# Patient Record
Sex: MALE | Race: OTHER | NOT HISPANIC OR LATINO | URBAN - METROPOLITAN AREA
[De-identification: names, ages, dates, MRNs, and addresses within clinical notes are randomized per-mention and may not be internally consistent; named-entity substitution may affect disease eponyms.]

---

## 2017-06-30 ENCOUNTER — EMERGENCY (EMERGENCY)
Facility: HOSPITAL | Age: 22
LOS: 0 days | Discharge: HOME | End: 2017-06-30

## 2017-06-30 DIAGNOSIS — L29.8 OTHER PRURITUS: ICD-10-CM

## 2017-06-30 DIAGNOSIS — L23.9 ALLERGIC CONTACT DERMATITIS, UNSPECIFIED CAUSE: ICD-10-CM

## 2017-11-18 ENCOUNTER — EMERGENCY (EMERGENCY)
Facility: HOSPITAL | Age: 22
LOS: 0 days | Discharge: HOME | End: 2017-11-18

## 2017-11-18 DIAGNOSIS — R10.84 GENERALIZED ABDOMINAL PAIN: ICD-10-CM

## 2017-11-18 DIAGNOSIS — R06.02 SHORTNESS OF BREATH: ICD-10-CM

## 2017-11-18 DIAGNOSIS — R11.10 VOMITING, UNSPECIFIED: ICD-10-CM

## 2018-03-09 ENCOUNTER — EMERGENCY (EMERGENCY)
Facility: HOSPITAL | Age: 23
LOS: 0 days | Discharge: HOME | End: 2018-03-10
Attending: EMERGENCY MEDICINE
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
## 2018-03-09 DIAGNOSIS — M79.631 PAIN IN RIGHT FOREARM: ICD-10-CM
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
## 2018-03-09 DIAGNOSIS — M25.511 PAIN IN RIGHT SHOULDER: ICD-10-CM
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
## 2018-03-09 RX ORDER — IBUPROFEN 200 MG
600 TABLET ORAL ONCE
Qty: 0 | Refills: 0 | Status: COMPLETED | OUTPATIENT
Start: 2018-03-09 | End: 2018-03-09

## 2018-03-09 RX ADMIN — Medication 600 MILLIGRAM(S): at 23:14

## 2018-03-09 NOTE — ED PEDIATRIC NURSE NOTE - OBJECTIVE STATEMENT
Patient c/o right arm pain from a fall that occurred on Wednesday. Denies LOC or any other symptoms.

## 2018-03-09 NOTE — ED PEDIATRIC NURSE NOTE - CHPI ED SYMPTOMS NEG
no loss of consciousness/no numbness/no abrasion/no bleeding/no weakness/no vomiting/no confusion/no fever/no deformity/no tingling

## 2018-03-10 NOTE — ED PROVIDER NOTE - ATTENDING CONTRIBUTION TO CARE
23yo m no pmh p/w R shoulder pain x 2d. Slipped on ice and fell onto R side 2d ago, since that time has had pain to R shoulder, worse w/mvmt. No head trauma/loc. Denies any focal numbness or weakness. PE: young m nad, ncat, neck supple, rrr nl s1s2, ctab, abd soft ntnd, R shoulder atraumatic, +ttp over ant joint/upper humerus, shoulder rom limited sec to pain, full rom/strength throughout remainder of ext, sensation intact, dpi, cr<2s, good  strength, remainder of ext exam nl. a/p: R shoulder pain s/p trauma - xr, pain control, reassess.

## 2018-03-10 NOTE — ED PROVIDER NOTE - CARE PLAN
Principal Discharge DX:	Shoulder pain  Goal:	xray r/o fracture  Assessment and plan of treatment:	motrin for pain, RICE, follow up orthopedics if pain does not resolve

## 2018-03-10 NOTE — ED PROVIDER NOTE - PHYSICAL EXAMINATION
General: Well developed; well nourished; in no acute distress    Eyes: EOM intact; conjunctiva and sclera clear, extra ocular movements intact  Head: Normocephalic; atraumatic  ENMT: External ear normal, tympanic membranes intact, no nasal discharge; airway clear, oropharynx clear  Neck: Supple; non tender; No cervical adenopathy  Respiratory: normal respiratory pattern, clear to auscultation bilaterally, no signs of increased work of breathing  Cardiovascular: Regular rate and rhythm. S1 and S2 Normal; No murmurs  Abdominal: Soft non-tender non-distended; normal bowel sounds; no hepatosplenomegaly; no masses  Extremities: R arm ROM limited due to pain, pain localized over inferior deltoid and upper humerus. No point tenderness over AC joint, clavicle intact. no gross deformity seen.  Neurological: Grossly intact  Skin: Warm and dry. No acute rash  Psychiatric: Cooperative and appropriate

## 2018-03-10 NOTE — ED PROVIDER NOTE - NS ED ROS FT
General: [x ] negative  [ ] abnormal:   Respiratory: [x ] negative  [ ] abnormal:  Cardiovascular: [ x] negative  [ ] abnormal:  Gastrointestinal:[ x] negative  [ ] abnormal:  Genitourinary: [ x] negative  [ ] abnormal:  Musculoskeletal: [ ] negative  [ x] abnormal: see hpi  Endocrine: [ ] negative  [ ] abnormal:   Heme/Lymph: [ ] negative  [ ] abnormal:   Neurological: [ x] negative  [ ] abnormal:   Skin: [ x] negative  [ ] abnormal:   Psychiatric: [ x] negative  [ ] abnormal:   Allergy and Immunologic: [ ] negative  [ ] abnormal:   All other systems reviewed and negative: [ ]

## 2018-08-08 ENCOUNTER — EMERGENCY (EMERGENCY)
Facility: HOSPITAL | Age: 23
LOS: 0 days | Discharge: HOME | End: 2018-08-08
Attending: PEDIATRICS | Admitting: PEDIATRICS

## 2018-08-08 VITALS
HEART RATE: 90 BPM | WEIGHT: 171.96 LBS | OXYGEN SATURATION: 100 % | RESPIRATION RATE: 20 BRPM | SYSTOLIC BLOOD PRESSURE: 147 MMHG | DIASTOLIC BLOOD PRESSURE: 96 MMHG | TEMPERATURE: 98 F

## 2018-08-08 DIAGNOSIS — R06.00 DYSPNEA, UNSPECIFIED: ICD-10-CM

## 2018-08-08 DIAGNOSIS — J06.9 ACUTE UPPER RESPIRATORY INFECTION, UNSPECIFIED: ICD-10-CM

## 2018-08-08 RX ORDER — IPRATROPIUM/ALBUTEROL SULFATE 18-103MCG
3 AEROSOL WITH ADAPTER (GRAM) INHALATION ONCE
Qty: 0 | Refills: 0 | Status: COMPLETED | OUTPATIENT
Start: 2018-08-08 | End: 2018-08-08

## 2018-08-08 RX ORDER — IBUPROFEN 200 MG
600 TABLET ORAL ONCE
Qty: 0 | Refills: 0 | Status: COMPLETED | OUTPATIENT
Start: 2018-08-08 | End: 2018-08-08

## 2018-08-08 RX ADMIN — Medication 600 MILLIGRAM(S): at 01:41

## 2018-08-08 RX ADMIN — Medication 3 MILLILITER(S): at 01:41

## 2018-08-08 NOTE — ED PROVIDER NOTE - OBJECTIVE STATEMENT
HPI:    PMH:  BIRTHHx: FT   VACCINES:  UTD  SOCIAL:  denies EtOH/tobacco/illicit drug use HPI: 23 y/o here for eval of uri s/s feels like cant breathe nkda never admitted , afebrile     PMH: na  BIRTHHx: FT   VACCINES:  UTD  SOCIAL:  denies EtOH/tobacco/illicit drug use

## 2018-08-08 NOTE — ED PEDIATRIC NURSE NOTE - OBJECTIVE STATEMENT
Patient presents to ED with complaints of difficulty taking a deep breath for one hour. Patient states he has been congested with "fever of 100.3" for one day. Patient is alert and in NAD.

## 2018-12-14 ENCOUNTER — TRANSCRIPTION ENCOUNTER (OUTPATIENT)
Age: 23
End: 2018-12-14

## 2019-10-19 ENCOUNTER — EMERGENCY (EMERGENCY)
Facility: HOSPITAL | Age: 24
LOS: 0 days | Discharge: HOME | End: 2019-10-19
Attending: EMERGENCY MEDICINE | Admitting: EMERGENCY MEDICINE
Payer: SUBSIDIZED

## 2019-10-19 VITALS
DIASTOLIC BLOOD PRESSURE: 81 MMHG | TEMPERATURE: 98 F | SYSTOLIC BLOOD PRESSURE: 136 MMHG | OXYGEN SATURATION: 99 % | RESPIRATION RATE: 18 BRPM | HEART RATE: 83 BPM

## 2019-10-19 DIAGNOSIS — R05 COUGH: ICD-10-CM

## 2019-10-19 DIAGNOSIS — R06.02 SHORTNESS OF BREATH: ICD-10-CM

## 2019-10-19 DIAGNOSIS — J06.9 ACUTE UPPER RESPIRATORY INFECTION, UNSPECIFIED: ICD-10-CM

## 2019-10-19 DIAGNOSIS — R11.2 NAUSEA WITH VOMITING, UNSPECIFIED: ICD-10-CM

## 2019-10-19 DIAGNOSIS — R50.9 FEVER, UNSPECIFIED: ICD-10-CM

## 2019-10-19 PROCEDURE — 93010 ELECTROCARDIOGRAM REPORT: CPT

## 2019-10-19 PROCEDURE — 99284 EMERGENCY DEPT VISIT MOD MDM: CPT

## 2019-10-19 PROCEDURE — 71046 X-RAY EXAM CHEST 2 VIEWS: CPT | Mod: 26

## 2019-10-19 RX ORDER — ONDANSETRON 8 MG/1
4 TABLET, FILM COATED ORAL ONCE
Refills: 0 | Status: COMPLETED | OUTPATIENT
Start: 2019-10-19 | End: 2019-10-19

## 2019-10-19 RX ADMIN — ONDANSETRON 4 MILLIGRAM(S): 8 TABLET, FILM COATED ORAL at 18:43

## 2019-10-19 NOTE — ED PROVIDER NOTE - PATIENT PORTAL LINK FT
You can access the FollowMyHealth Patient Portal offered by [facility] by registering at the following website: [email or web address]. By joining [facility]’s FollowMyHealth portal, you will also be able to view your health information using other applications (apps) compatible with our system.

## 2019-10-19 NOTE — ED PROVIDER NOTE - PROGRESS NOTE DETAILS
patient feeling improved after zofran, tolerating water. will dc w pmd f/u. patient and family verbalize understanding of return precautions.

## 2019-10-19 NOTE — ED PROVIDER NOTE - NSFOLLOWUPCLINICS_GEN_ALL_ED_FT
[facility]  Medicine  [address], [place]   Phone: [phone]  Fax:   Follow Up Time: 1-3 Days

## 2019-10-19 NOTE — ED ADULT NURSE NOTE - OBJECTIVE STATEMENT
Patient c/o cough and sore throat x 2 days. This afternoon at 5pm patient woke up from nap and vomited 4 times NBNB emesis. Patient also c/o chest tightness and SOB since 5pm. On assessment no signs of resp distress noted. Patient states cough is nonproductive.

## 2019-10-19 NOTE — ED PROVIDER NOTE - NSFOLLOWUPINSTRUCTIONS_ED_ALL_ED_FT
Viral Respiratory Infection    A viral respiratory infection is an illness that affects parts of the body used for breathing, like the lungs, nose, and throat. It is caused by a germ called a virus. Symptoms can include runny nose, coughing, sneezing, fatigue, body aches, sore throat, fever, or headache. Over the counter medicine can be used to manage the symptoms but the infection typically goes away on its own in 5 to 10 days.     SEEK IMMEDIATE MEDICAL CARE IF YOU HAVE ANY OF THE FOLLOWING SYMPTOMS: shortness of breath, chest pain, fever over 10 days, or lightheadedness/dizziness.    Nausea / Vomiting    Nausea is the feeling that you have to vomit. As nausea gets worse, it can lead to vomiting. Vomiting puts you at an increased risk for dehydration. Older adults and people with other diseases or a weak immune system are at higher risk for dehydration. Drink clear fluids in small but frequent amounts as tolerated. Eat bland, easy-to-digest foods in small amounts as tolerated.    SEEK IMMEDIATE MEDICAL CARE IF YOU HAVE ANY OF THE FOLLOWING SYMPTOMS: fever, inability to keep sufficient fluids down, black or bloody vomitus, black or bloody stools, lightheadedness/dizziness, chest pain, severe headache, rash, shortness of breath, cold or clammy skin, confusion, pain with urination, or any signs of dehydration.

## 2019-10-19 NOTE — ED PROVIDER NOTE - NS ED ROS FT
CONSTITUTIONAL: (+) tactile fevers, (-) chills, (-) decreased appetite  EYES: (-) eye redness, (-) eye discharge  ENT: (-) ear pain, (+) congestion, (-) rhinorrhea, (-) sinus pain, (+) sore throat, (-) difficulty swallowing  CARDIO: (-) chest pain, (-) palpitations, (-) edema  PULM: (+) cough, (-) sputum, (-) shortness of breath, (-) wheezing, (-) hemoptysis, (-) stridor  GI: (+) nausea, (+) vomiting, (-) diarrhea, (-) constipation, (-) abdominal pain  MSK: (-) back pain, (-) myalgias  SKIN: (-) rashes, (-) pallor    *all other systems negative except as documented above and in the HPI*

## 2019-10-19 NOTE — ED PROVIDER NOTE - CLINICAL SUMMARY MEDICAL DECISION MAKING FREE TEXT BOX
I personally evaluated the patient. I reviewed the Resident’s or Physician Assistant’s note (as assigned above), and agree with the findings and plan except as documented in my note. Patient evaluated for vomiting, patient tolerated PO, multiple repeat abdominal exams unremarkable. I have fully discussed the medical management and delivery of care with the patient. I have discussed any available labs, imaging and treatment options with the patient. Patient confirms understanding and has been given detailed return precautions. Patient instructed to return to the ED should symptoms persist or worsen. Patient has demonstrated capacity and has verbalized understanding. Patient is well appearing upon discharge.

## 2019-10-19 NOTE — ED PROVIDER NOTE - ATTENDING CONTRIBUTION TO CARE
24 year old male, no sig pmhx, come sin with uri, ymptoms started 3 days ago, + dry-mildly productive cough, + fever at home, + sore throat, no cp/sob, no loc, + few episodes of nb nb vomiting, mainly after coughing, patient adamantly denies abdominal pain, no urinary symptoms    CONSTITUTIONAL: Well-developed; well-nourished; in no acute distress. Sitting up and providing appropriate history and physical examination  SKIN: skin exam is warm and dry, no acute rash.  HEAD: Normocephalic; atraumatic.  EYES: PERRL, 3 mm bilateral, no nystagmus, EOM intact; conjunctiva and sclera clear.  ENT: + Pharyngeal erythema, no exudate, no edema, no signs of obstruction, No nasal discharge; airway clear.  NECK: Supple; non tender. + full passive ROM in all directions. No JVD  CARD: S1, S2 normal; no murmurs, gallops, or rubs. Regular rate and rhythm. + Symmetric Strong Pulses  RESP: No wheezes, rales or rhonchi. Good air movement bilaterally  ABD: soft; non-distended; non-tender. No Rebound, No Guarding, No signs of peritonitis, No CVA tenderness. No pulsatile abdominal mass. + Strong and Symmetric Pulses  EXT: Normal ROM. No clubbing, cyanosis or edema. Dp and Pt Pulses intact. Cap refill less than 3 seconds  NEURO: CN 2-12 intact, normal finger to nose, normal romberg, stable gait, no sensory or motor deficits, Alert, oriented, grossly unremarkable. No Focal deficits. GCS 15. NIH 0  PSYCH: Cooperative, appropriate.

## 2019-10-19 NOTE — ED PROVIDER NOTE - PHYSICAL EXAMINATION
VITALS:  I have reviewed the initial vital signs.  GENERAL: Well-developed, well-nourished, in no acute distress. Nontoxic.  HEENT: Sclera clear. No conjunctival injection. EOMI, PERRLA. Mucous membranes moist, +posterior oropharynx erythema without swelling or lesions. Tonsils 2+ bilaterally and w/o exudate. Uvula midline and without edema. TM's clear b/l without bulging or erythema. +b/l nasal congestion. No sinus ttp.  NECK: supple w FROM. No cervical adenopathy. No meningismus.   CARDIO: RRR, nl S1 and S2. No murmurs, rubs, or gallops. No peripheral edema. 2+ radial and pedal pulses bilaterally.  PULM: Normal effort. CTA b/l without wheezes, rales, or rhonchi.  MSK: Normal, steady gait.  GI: Normal bowel sounds. Abdomen soft and non-distended. Nontender in all four quadrants without rebound or guarding.  SKIN: Warm, dry. No pallor or rashes. Capillary refill <2 seconds.  NEURO: A&Ox3. Speech clear. No gross motor/sensory deficits.

## 2019-10-19 NOTE — ED PROVIDER NOTE - OBJECTIVE STATEMENT
24 year old male w no significant pmhx presents to the ED for evaluation of mild, constant nausea that began around 5:00 PM today. Patient states he has had nonproductive cough, sore throat, and congestion x 3 days with subjective fevers at home. Today woke up from a nap and felt nauseated, experienced 4 episodes of NBNB vomiting. Denies sputum, wheezing, stridor, chest pain, palpitations, shortness of breath, abd pain, diarrhea, rashes, recent travel, known sick contacts.

## 2021-12-03 ENCOUNTER — APPOINTMENT (OUTPATIENT)
Dept: OTOLARYNGOLOGY | Facility: CLINIC | Age: 26
End: 2021-12-03
Payer: COMMERCIAL

## 2021-12-03 VITALS
OXYGEN SATURATION: 99 % | HEIGHT: 72 IN | HEART RATE: 72 BPM | SYSTOLIC BLOOD PRESSURE: 118 MMHG | TEMPERATURE: 98.4 F | WEIGHT: 190 LBS | BODY MASS INDEX: 25.73 KG/M2 | DIASTOLIC BLOOD PRESSURE: 76 MMHG

## 2021-12-03 DIAGNOSIS — Z72.89 OTHER PROBLEMS RELATED TO LIFESTYLE: ICD-10-CM

## 2021-12-03 DIAGNOSIS — Z78.9 OTHER SPECIFIED HEALTH STATUS: ICD-10-CM

## 2021-12-03 DIAGNOSIS — R06.89 OTHER ABNORMALITIES OF BREATHING: ICD-10-CM

## 2021-12-03 PROBLEM — Z00.00 ENCOUNTER FOR PREVENTIVE HEALTH EXAMINATION: Status: ACTIVE | Noted: 2021-12-03

## 2021-12-03 PROCEDURE — 99203 OFFICE O/P NEW LOW 30 MIN: CPT | Mod: 25

## 2021-12-03 PROCEDURE — 31231 NASAL ENDOSCOPY DX: CPT

## 2021-12-03 RX ORDER — FEXOFENADINE HYDROCHLORIDE 180 MG/1
180 TABLET ORAL DAILY
Qty: 1 | Refills: 8 | Status: ACTIVE | COMMUNITY
Start: 2021-12-03 | End: 1900-01-01

## 2021-12-03 NOTE — REASON FOR VISIT
[Initial Evaluation] : an initial evaluation for [FreeTextEntry2] : Deviated nasal septum.  Occasional epistaxis for the past 4  years.  Patient states his level of severity is a level 5 out of 10 and it occurs constant.  Patient states nothing helps to improve or worsens his Deviated nasal septum.  Occasional epistaxis for the past 4  years.

## 2021-12-03 NOTE — HISTORY OF PRESENT ILLNESS
[SMR] : submucous resection (SMR) [de-identified] : 26 years old male patient with history of Deviated nasal septum.  Occasional epistaxis for the past 4  years.  Patient is present today in the office with  Deviated Nasal Septum.  Turbinate Hypertrophy.  Anterior Nasal Septum Perforation. Multiple dilated nasal blood vessels.   Epistaxis.  Bacitracin ointment was inserted into patient nostrils for lubrication  [de-identified] : 2015 in [place]

## 2021-12-03 NOTE — PHYSICAL EXAM
[de-identified] :  Deviated Nasal Septum.  Turbinate Hypertrophy.  Anterior Nasal Septum Perforation. Multiple dilated nasal blood vessels.   Epistaxis.  Bacitracin ointment was inserted into patient nostrils for lubrication  [Normal] : mucosa is normal [Midline] : trachea located in midline position

## 2021-12-03 NOTE — PROCEDURE
[Epistaxis] : evaluation of epistaxis [Congested] : congested [Deviated to the Lt] : deviated to the left [Perforation ___ cm] : [unfilled]Ucm perforation [Image(s) Captured] : image(s) captured and filed [Topical Lidocaine] : topical lidocaine [Oxymetazoline HCl] : oxymetazoline HCl [Flexible Endoscope] : examined with the flexible endoscope [Serial Number: ___] : Serial Number: [unfilled] [Cauterized w/Silver Nitrate] : the bleeding was not cauterized with silver nitrate [FreeTextEntry6] :  Deviated Nasal Septum.  Turbinate Hypertrophy.  Anterior Nasal Septum Perforation. Multiple dilated nasal blood vessels.   Epistaxis.  Bacitracin ointment was inserted into patient nostrils for lubrication  [de-identified] :  Deviated Nasal Septum.  Turbinate Hypertrophy.  Anterior Nasal Septum Perforation. Multiple dilated nasal blood vessels.   Epistaxis

## 2022-03-09 LAB — SARS-COV-2 N GENE NPH QL NAA+PROBE: DETECTED

## 2022-03-17 ENCOUNTER — APPOINTMENT (OUTPATIENT)
Dept: OTOLARYNGOLOGY | Facility: CLINIC | Age: 27
End: 2022-03-17

## 2022-03-24 DIAGNOSIS — Z01.818 ENCOUNTER FOR OTHER PREPROCEDURAL EXAMINATION: ICD-10-CM

## 2022-04-07 LAB — SARS-COV-2 N GENE NPH QL NAA+PROBE: NOT DETECTED

## 2022-04-08 ENCOUNTER — APPOINTMENT (OUTPATIENT)
Dept: OTOLARYNGOLOGY | Facility: CLINIC | Age: 27
End: 2022-04-08
Payer: COMMERCIAL

## 2022-04-08 VITALS
RESPIRATION RATE: 14 BRPM | SYSTOLIC BLOOD PRESSURE: 119 MMHG | HEART RATE: 83 BPM | TEMPERATURE: 98.4 F | DIASTOLIC BLOOD PRESSURE: 78 MMHG | OXYGEN SATURATION: 98 %

## 2022-04-08 DIAGNOSIS — J34.89 OTHER SPECIFIED DISORDERS OF NOSE AND NASAL SINUSES: ICD-10-CM

## 2022-04-08 DIAGNOSIS — J34.2 DEVIATED NASAL SEPTUM: ICD-10-CM

## 2022-04-08 DIAGNOSIS — J34.3 HYPERTROPHY OF NASAL TURBINATES: ICD-10-CM

## 2022-04-08 DIAGNOSIS — R04.0 EPISTAXIS: ICD-10-CM

## 2022-04-08 PROCEDURE — 30802 ABLATE INF TURBINATE SUBMUC: CPT | Mod: 59

## 2022-04-08 PROCEDURE — 30903 CONTROL OF NOSEBLEED: CPT | Mod: 50

## 2022-04-22 ENCOUNTER — TRANSCRIPTION ENCOUNTER (OUTPATIENT)
Age: 27
End: 2022-04-22

## 2022-04-28 ENCOUNTER — APPOINTMENT (OUTPATIENT)
Dept: OTOLARYNGOLOGY | Facility: CLINIC | Age: 27
End: 2022-04-28

## 2022-05-09 PROBLEM — J34.2 DEVIATED NASAL SEPTUM: Status: ACTIVE | Noted: 2021-12-03

## 2022-05-09 PROBLEM — J34.89 NASAL SEPTUM PERFORATION: Status: ACTIVE | Noted: 2021-12-03

## 2022-05-09 PROBLEM — J34.3 HYPERTROPHY OF BOTH INFERIOR NASAL TURBINATES: Status: ACTIVE | Noted: 2021-12-03

## 2022-05-09 PROBLEM — R04.0 EPISTAXIS: Status: ACTIVE | Noted: 2021-12-03

## 2022-05-10 NOTE — HISTORY OF PRESENT ILLNESS
[SMR] : submucous resection (SMR) [de-identified] : 26 years old male patient with history of Deviated nasal septum.  Occasional epistaxis for the past 4  years.  Patient is present today in the office with  Deviated Nasal Septum.  Turbinate Hypertrophy.  Anterior Nasal Septum Perforation. Multiple dilated nasal blood vessels.  Laser Assisted Reduction of Nasal Turbinate and Laser Assisted  Control of Epistaxis. [de-identified] : 2015 in [place]

## 2022-05-10 NOTE — REASON FOR VISIT
[Subsequent Evaluation] : a subsequent evaluation for [FreeTextEntry2] : Deviated nasal septum.  Occasional epistaxis for the past 4  years.

## 2022-05-10 NOTE — PHYSICAL EXAM
[Midline] : trachea located in midline position [Normal] : no rashes [de-identified] :  Deviated Nasal Septum.  Turbinate Hypertrophy.  Anterior Nasal Septum Perforation. Multiple dilated nasal blood vessels.   Epistaxis.  Bacitracin ointment was inserted into patient nostrils for lubrication

## 2022-05-10 NOTE — PROCEDURE
[Image(s) Captured] : image(s) captured and filed [Epistaxis] : evaluation of epistaxis [Topical Lidocaine] : topical lidocaine [Oxymetazoline HCl] : oxymetazoline HCl [Rigid Endoscope] : examined with a rigid endoscope [Serial Number: ___] : Serial Number: [unfilled] [Congested] : congested [Deviated to the Lt] : deviated to the left [Perforation ___ cm] : [unfilled]Ucm perforation [Cauterized w/Silver Nitrate] : the bleeding was cauterized with silver nitrate [FreeTextEntry1] :  Laser Assisted Reduction of Nasal Turbinate and Laser Assisted  Control of Epistaxis. ( Diode Laser ) [FreeTextEntry3] :  Laser Assisted Reduction of Nasal Turbinate and Laser Assisted  Control of Epistaxis. ( Diode Laser )  [FreeTextEntry6] :  Deviated Nasal Septum.  Turbinate Hypertrophy.  Anterior Nasal Septum Perforation. Multiple dilated nasal blood vessels. [FreeTextEntry2] :  Laser Assisted  Control of Epistaxis. ( Diode Laser )  [de-identified] :  Deviated Nasal Septum.  Turbinate Hypertrophy.  Anterior Nasal Septum Perforation. Multiple dilated nasal blood vessels.   Epistaxis

## 2024-03-21 NOTE — ED PROVIDER NOTE - OBJECTIVE STATEMENT
Medical Necessity Information: It is in your best interest to select a reason for this procedure from the list below. All of these items fulfill various CMS LCD requirements except the new and changing color options. Consent: The patient's consent was obtained including but not limited to risks of crusting, scabbing, blistering, scarring, darker or lighter pigmentary change, recurrence, incomplete removal and infection. Show Topical Anesthesia Variable?: Yes Detail Level: Simple Application Tool (Optional): Cry-AC Post-Care Instructions: I reviewed with the patient in detail post-care instructions. Patient is to wear sunprotection, and avoid picking at any of the treated lesions. Pt may apply Vaseline to crusted or scabbing areas. Number Of Freeze-Thaw Cycles: 1 freeze-thaw cycle Duration Of Freeze Thaw-Cycle (Seconds): 5-10 Render Note In Bullet Format When Appropriate: No Spray Paint Text: The liquid nitrogen was applied to the skin utilizing a spray paint frosting technique. 21 yo male here with shoulder/arm pain x 2 days. per pt, fell on ice landing on R arm 2 days prior. Today pain in arm/shoulder is worse, has limited strength and ROM. Not taking medication, no treatment modalities initiated. No prior injuries. No other medical problems. States able to move but limited due to pain. Medical Necessity Clause: This procedure was medically necessary because the lesions that were treated were:

## 2024-12-04 ENCOUNTER — EMERGENCY (EMERGENCY)
Facility: HOSPITAL | Age: 29
LOS: 0 days | Discharge: HOME | End: 2024-12-04
Attending: STUDENT IN AN ORGANIZED HEALTH CARE EDUCATION/TRAINING PROGRAM
Payer: COMMERCIAL

## 2024-12-04 VITALS
TEMPERATURE: 98 F | RESPIRATION RATE: 22 BRPM | OXYGEN SATURATION: 99 % | SYSTOLIC BLOOD PRESSURE: 139 MMHG | DIASTOLIC BLOOD PRESSURE: 96 MMHG | HEIGHT: 74 IN | HEART RATE: 100 BPM | WEIGHT: 190.04 LBS

## 2024-12-04 VITALS
SYSTOLIC BLOOD PRESSURE: 121 MMHG | OXYGEN SATURATION: 97 % | RESPIRATION RATE: 16 BRPM | HEART RATE: 78 BPM | DIASTOLIC BLOOD PRESSURE: 76 MMHG

## 2024-12-04 DIAGNOSIS — R11.10 VOMITING, UNSPECIFIED: ICD-10-CM

## 2024-12-04 DIAGNOSIS — J06.9 ACUTE UPPER RESPIRATORY INFECTION, UNSPECIFIED: ICD-10-CM

## 2024-12-04 DIAGNOSIS — R07.89 OTHER CHEST PAIN: ICD-10-CM

## 2024-12-04 DIAGNOSIS — Z88.1 ALLERGY STATUS TO OTHER ANTIBIOTIC AGENTS: ICD-10-CM

## 2024-12-04 DIAGNOSIS — R05.1 ACUTE COUGH: ICD-10-CM

## 2024-12-04 LAB
ALBUMIN SERPL ELPH-MCNC: 4.6 G/DL — SIGNIFICANT CHANGE UP (ref 3.5–5.2)
ALP SERPL-CCNC: 69 U/L — SIGNIFICANT CHANGE UP (ref 30–115)
ALT FLD-CCNC: 10 U/L — SIGNIFICANT CHANGE UP (ref 0–41)
ANION GAP SERPL CALC-SCNC: 14 MMOL/L — SIGNIFICANT CHANGE UP (ref 7–14)
AST SERPL-CCNC: 16 U/L — SIGNIFICANT CHANGE UP (ref 0–41)
BASOPHILS # BLD AUTO: 0.04 K/UL — SIGNIFICANT CHANGE UP (ref 0–0.2)
BASOPHILS NFR BLD AUTO: 0.6 % — SIGNIFICANT CHANGE UP (ref 0–1)
BILIRUB SERPL-MCNC: 0.4 MG/DL — SIGNIFICANT CHANGE UP (ref 0.2–1.2)
BUN SERPL-MCNC: 14 MG/DL — SIGNIFICANT CHANGE UP (ref 10–20)
CALCIUM SERPL-MCNC: 9.6 MG/DL — SIGNIFICANT CHANGE UP (ref 8.4–10.5)
CHLORIDE SERPL-SCNC: 104 MMOL/L — SIGNIFICANT CHANGE UP (ref 98–110)
CO2 SERPL-SCNC: 23 MMOL/L — SIGNIFICANT CHANGE UP (ref 17–32)
CREAT SERPL-MCNC: 0.9 MG/DL — SIGNIFICANT CHANGE UP (ref 0.7–1.5)
EGFR: 120 ML/MIN/1.73M2 — SIGNIFICANT CHANGE UP
EOSINOPHIL # BLD AUTO: 0.31 K/UL — SIGNIFICANT CHANGE UP (ref 0–0.7)
EOSINOPHIL NFR BLD AUTO: 4.5 % — SIGNIFICANT CHANGE UP (ref 0–8)
GLUCOSE SERPL-MCNC: 95 MG/DL — SIGNIFICANT CHANGE UP (ref 70–99)
HCT VFR BLD CALC: 42.7 % — SIGNIFICANT CHANGE UP (ref 42–52)
HGB BLD-MCNC: 14.9 G/DL — SIGNIFICANT CHANGE UP (ref 14–18)
IMM GRANULOCYTES NFR BLD AUTO: 0.4 % — HIGH (ref 0.1–0.3)
LIDOCAIN IGE QN: 28 U/L — SIGNIFICANT CHANGE UP (ref 7–60)
LYMPHOCYTES # BLD AUTO: 2 K/UL — SIGNIFICANT CHANGE UP (ref 1.2–3.4)
LYMPHOCYTES # BLD AUTO: 28.9 % — SIGNIFICANT CHANGE UP (ref 20.5–51.1)
MCHC RBC-ENTMCNC: 31.3 PG — HIGH (ref 27–31)
MCHC RBC-ENTMCNC: 34.9 G/DL — SIGNIFICANT CHANGE UP (ref 32–37)
MCV RBC AUTO: 89.7 FL — SIGNIFICANT CHANGE UP (ref 80–94)
MONOCYTES # BLD AUTO: 0.53 K/UL — SIGNIFICANT CHANGE UP (ref 0.1–0.6)
MONOCYTES NFR BLD AUTO: 7.7 % — SIGNIFICANT CHANGE UP (ref 1.7–9.3)
NEUTROPHILS # BLD AUTO: 4.01 K/UL — SIGNIFICANT CHANGE UP (ref 1.4–6.5)
NEUTROPHILS NFR BLD AUTO: 57.9 % — SIGNIFICANT CHANGE UP (ref 42.2–75.2)
NRBC # BLD: 0 /100 WBCS — SIGNIFICANT CHANGE UP (ref 0–0)
PLATELET # BLD AUTO: 225 K/UL — SIGNIFICANT CHANGE UP (ref 130–400)
PMV BLD: 9.4 FL — SIGNIFICANT CHANGE UP (ref 7.4–10.4)
POTASSIUM SERPL-MCNC: 4 MMOL/L — SIGNIFICANT CHANGE UP (ref 3.5–5)
POTASSIUM SERPL-SCNC: 4 MMOL/L — SIGNIFICANT CHANGE UP (ref 3.5–5)
PROT SERPL-MCNC: 7.1 G/DL — SIGNIFICANT CHANGE UP (ref 6–8)
RBC # BLD: 4.76 M/UL — SIGNIFICANT CHANGE UP (ref 4.7–6.1)
RBC # FLD: 12.5 % — SIGNIFICANT CHANGE UP (ref 11.5–14.5)
SODIUM SERPL-SCNC: 141 MMOL/L — SIGNIFICANT CHANGE UP (ref 135–146)
WBC # BLD: 6.92 K/UL — SIGNIFICANT CHANGE UP (ref 4.8–10.8)
WBC # FLD AUTO: 6.92 K/UL — SIGNIFICANT CHANGE UP (ref 4.8–10.8)

## 2024-12-04 PROCEDURE — 71046 X-RAY EXAM CHEST 2 VIEWS: CPT | Mod: 26

## 2024-12-04 PROCEDURE — 94640 AIRWAY INHALATION TREATMENT: CPT

## 2024-12-04 PROCEDURE — 93005 ELECTROCARDIOGRAM TRACING: CPT

## 2024-12-04 PROCEDURE — 99285 EMERGENCY DEPT VISIT HI MDM: CPT

## 2024-12-04 PROCEDURE — 71046 X-RAY EXAM CHEST 2 VIEWS: CPT

## 2024-12-04 PROCEDURE — 83690 ASSAY OF LIPASE: CPT

## 2024-12-04 PROCEDURE — 96374 THER/PROPH/DIAG INJ IV PUSH: CPT

## 2024-12-04 PROCEDURE — 85025 COMPLETE CBC W/AUTO DIFF WBC: CPT

## 2024-12-04 PROCEDURE — 80053 COMPREHEN METABOLIC PANEL: CPT

## 2024-12-04 PROCEDURE — 36415 COLL VENOUS BLD VENIPUNCTURE: CPT

## 2024-12-04 PROCEDURE — 93010 ELECTROCARDIOGRAM REPORT: CPT

## 2024-12-04 PROCEDURE — 99285 EMERGENCY DEPT VISIT HI MDM: CPT | Mod: 25

## 2024-12-04 RX ORDER — ALBUTEROL 90 MCG
2 AEROSOL (GRAM) INHALATION
Qty: 1 | Refills: 0
Start: 2024-12-04 | End: 2024-12-10

## 2024-12-04 RX ORDER — IPRATROPIUM BROMIDE AND ALBUTEROL SULFATE 2.5; .5 MG/3ML; MG/3ML
3 SOLUTION RESPIRATORY (INHALATION)
Refills: 0 | Status: COMPLETED | OUTPATIENT
Start: 2024-12-04 | End: 2024-12-04

## 2024-12-04 RX ORDER — SODIUM CHLORIDE 9 MG/ML
1000 INJECTION, SOLUTION INTRAMUSCULAR; INTRAVENOUS; SUBCUTANEOUS ONCE
Refills: 0 | Status: COMPLETED | OUTPATIENT
Start: 2024-12-04 | End: 2024-12-04

## 2024-12-04 RX ORDER — ONDANSETRON HYDROCHLORIDE 4 MG/1
4 TABLET, FILM COATED ORAL ONCE
Refills: 0 | Status: COMPLETED | OUTPATIENT
Start: 2024-12-04 | End: 2024-12-04

## 2024-12-04 RX ORDER — PREDNISONE 20 MG/1
50 TABLET ORAL ONCE
Refills: 0 | Status: DISCONTINUED | OUTPATIENT
Start: 2024-12-04 | End: 2024-12-04

## 2024-12-04 RX ORDER — PREDNISONE 20 MG/1
2 TABLET ORAL
Qty: 10 | Refills: 0
Start: 2024-12-04 | End: 2024-12-08

## 2024-12-04 RX ADMIN — IPRATROPIUM BROMIDE AND ALBUTEROL SULFATE 3 MILLILITER(S): 2.5; .5 SOLUTION RESPIRATORY (INHALATION) at 07:25

## 2024-12-04 RX ADMIN — SODIUM CHLORIDE 1000 MILLILITER(S): 9 INJECTION, SOLUTION INTRAMUSCULAR; INTRAVENOUS; SUBCUTANEOUS at 07:43

## 2024-12-04 RX ADMIN — ONDANSETRON HYDROCHLORIDE 4 MILLIGRAM(S): 4 TABLET, FILM COATED ORAL at 07:45

## 2024-12-04 RX ADMIN — IPRATROPIUM BROMIDE AND ALBUTEROL SULFATE 3 MILLILITER(S): 2.5; .5 SOLUTION RESPIRATORY (INHALATION) at 07:41

## 2024-12-04 RX ADMIN — IPRATROPIUM BROMIDE AND ALBUTEROL SULFATE 3 MILLILITER(S): 2.5; .5 SOLUTION RESPIRATORY (INHALATION) at 08:05

## 2024-12-04 NOTE — ED PROVIDER NOTE - NSFOLLOWUPINSTRUCTIONS_ED_ALL_ED_FT
Our Emergency Department Referral Coordinators will be reaching out to you in the next 24-48 hours from 9:00am to 5:00pm with a follow up appointment. Please expect a phone call from the hospital in that time frame. If you do not receive a call or if you have any questions or concerns, you can reach them at   [phone]

## 2024-12-04 NOTE — ED PROVIDER NOTE - PATIENT PORTAL LINK FT
You can access the FollowMyHealth Patient Portal offered by [facility] by registering at the following website: [email or web address]. By joining [facility]’s FollowMyHealth portal, you will also be able to view your health information using other applications (apps) compatible with our system.

## 2024-12-04 NOTE — ED PROVIDER NOTE - OBJECTIVE STATEMENT
29 year old male with no no pmhx presents to ed with cough congestion and chest wall pain x 1 day. Mild body aches and few episodes of vomiting. no fever, sob, calf pain or swelling, abd pain or diarrhea

## 2024-12-04 NOTE — ED ADULT TRIAGE NOTE - CHIEF COMPLAINT QUOTE
He's been throwing up for two hours and he can't breathe because he have the pain in the chest - brother

## 2024-12-04 NOTE — ED PROVIDER NOTE - ED STEMI HIDDEN
Patient ambulatory to ED treatment area with steady gait for complaint of \"I have been sick for 20 days. I have a headache, weight loss, and a little cough. \" Patient unsure if febrile but reports \"I have been sweating. \" Reports muscles aches. Patient has been taking aspirin for pain (took this morning last). Reports nausea. hide

## 2024-12-04 NOTE — ED PROVIDER NOTE - CLINICAL SUMMARY MEDICAL DECISION MAKING FREE TEXT BOX
29-year-old male, no pertinent past medical history, presenting with congestion and chest pain that started while sleeping, woke him up at 4 AM, nonradiating, worse with movement, no relieving factors, associated with nausea and nonbloody nonbilious vomiting.  He states last year he had similar symptoms and symptoms were alleviated with nebulizer.  Denies fevers, headache, dizziness, abdominal pain, diarrhea.  Well-appearing on exam.  In no acute distress.  Heart RRR.  Lungs clear to auscultation bilaterally.  Anterior chest wall tenderness to palpation reproducible.  Abdomen soft nondistended nontender.  No lower extremity edema.  Labs and EKG were ordered and reviewed.  Imaging was ordered and reviewed by me.  Appropriate medications for patient's presenting complaints were ordered and effects were reassessed.  Escalation to admission/observation was considered.  1) However patient feels much better and is comfortable with discharge.  Appropriate follow-up was arranged. 2) Patient requires inpatient hospitalization - monitored setting. 29-year-old male, no pertinent past medical history, presenting with congestion and chest pain that started while sleeping, woke him up at 4 AM, nonradiating, worse with movement, no relieving factors, associated with nausea and nonbloody nonbilious vomiting.  He states last year he had similar symptoms and symptoms were alleviated with nebulizer.  Denies fevers, headache, dizziness, abdominal pain, diarrhea.  Well-appearing on exam.  In no acute distress.  Heart RRR.  Lungs clear to auscultation bilaterally.  Anterior chest wall tenderness to palpation reproducible.  Abdomen soft nondistended nontender.  No lower extremity edema.  Labs and EKG were ordered and reviewed.  Imaging was ordered and reviewed by me.  Appropriate medications for patient's presenting complaints were ordered and effects were reassessed.  Patient feels improved.  Discussed all results with patient.  Given return precautions and follow up outpatient.  Patient comfortable with plan.

## 2024-12-04 NOTE — ED PROVIDER NOTE - PHYSICAL EXAMINATION
CONST: Well appearing in NAD  EYES: PERRL, EOMI, Sclera and conjunctiva clear.   ENT: + nasal discharge. Oropharynx normal appearing  NECK: Non-tender, no meningeal signs. normal ROM. supple   CARD: Normal S1 S2; Normal rate and rhythm  RESP: Equal BS B/L, scnat expiratory wheezing bilaterally. No distress  GI: Soft, non-tender, non-distended. no cva tenderness. normal BS  MS: chest wall tenderness, Normal ROM in all extremities. No midline spinal tenderness. pulses 2 +. no calf tenderness or swelling  SKIN: Warm, dry, no acute rashes. Good turgor  NEURO: A&Ox3, No focal deficits.